# Patient Record
Sex: FEMALE | Race: WHITE | Employment: FULL TIME | URBAN - METROPOLITAN AREA
[De-identification: names, ages, dates, MRNs, and addresses within clinical notes are randomized per-mention and may not be internally consistent; named-entity substitution may affect disease eponyms.]

---

## 2017-06-20 ENCOUNTER — HOSPITAL ENCOUNTER (OUTPATIENT)
Dept: OTHER | Age: 22
Discharge: HOME | End: 2017-06-20
Attending: OBSTETRICS & GYNECOLOGY | Admitting: OBSTETRICS & GYNECOLOGY

## 2017-06-20 LAB
ABO/RH: NORMAL
ANTIBODY SCREEN: NORMAL
BASOPHILS ABSOLUTE: 0.1 K/UL (ref 0–0.2)
BASOPHILS RELATIVE PERCENT: 0.8 %
EOSINOPHILS ABSOLUTE: 0.2 K/UL (ref 0–0.6)
EOSINOPHILS RELATIVE PERCENT: 1.6 %
HCT VFR BLD CALC: 34.7 % (ref 36–48)
HEMOGLOBIN: 11.6 G/DL (ref 12–16)
LYMPHOCYTES ABSOLUTE: 1.9 K/UL (ref 1–5.1)
LYMPHOCYTES RELATIVE PERCENT: 13.8 %
MCH RBC QN AUTO: 27.9 PG (ref 26–34)
MCHC RBC AUTO-ENTMCNC: 33.5 G/DL (ref 31–36)
MCV RBC AUTO: 83.3 FL (ref 80–100)
MONOCYTES ABSOLUTE: 1.2 K/UL (ref 0–1.3)
MONOCYTES RELATIVE PERCENT: 8.7 %
NEUTROPHILS ABSOLUTE: 10.4 K/UL (ref 1.7–7.7)
NEUTROPHILS RELATIVE PERCENT: 75.1 %
PDW BLD-RTO: 16.1 % (ref 12.4–15.4)
PLATELET # BLD: 252 K/UL (ref 135–450)
PMV BLD AUTO: 8.8 FL (ref 5–10.5)
RBC # BLD: 4.17 M/UL (ref 4–5.2)
WBC # BLD: 13.8 K/UL (ref 4–11)

## 2017-06-21 PROBLEM — Z3A.39 39 WEEKS GESTATION OF PREGNANCY: Status: ACTIVE | Noted: 2017-06-21

## 2017-06-21 LAB — RPR: NORMAL

## 2020-06-23 ENCOUNTER — OFFICE VISIT (OUTPATIENT)
Dept: ORTHOPEDIC SURGERY | Age: 25
End: 2020-06-23
Payer: MEDICAID

## 2020-06-23 ENCOUNTER — TELEPHONE (OUTPATIENT)
Dept: ORTHOPEDIC SURGERY | Age: 25
End: 2020-06-23

## 2020-06-23 VITALS — BODY MASS INDEX: 38.32 KG/M2 | HEIGHT: 65 IN | TEMPERATURE: 97.2 F | WEIGHT: 230 LBS

## 2020-06-23 PROBLEM — S93.401A SPRAIN OF RIGHT ANKLE: Status: ACTIVE | Noted: 2020-06-23

## 2020-06-23 PROCEDURE — G8427 DOCREV CUR MEDS BY ELIG CLIN: HCPCS | Performed by: PHYSICIAN ASSISTANT

## 2020-06-23 PROCEDURE — G8417 CALC BMI ABV UP PARAM F/U: HCPCS | Performed by: PHYSICIAN ASSISTANT

## 2020-06-23 PROCEDURE — 99202 OFFICE O/P NEW SF 15 MIN: CPT | Performed by: PHYSICIAN ASSISTANT

## 2020-06-23 PROCEDURE — L4360 PNEUMAT WALKING BOOT PRE CST: HCPCS | Performed by: PHYSICIAN ASSISTANT

## 2020-06-23 PROCEDURE — 4004F PT TOBACCO SCREEN RCVD TLK: CPT | Performed by: PHYSICIAN ASSISTANT

## 2020-06-23 NOTE — TELEPHONE ENCOUNTER
6/23/20 DME   - NOT COVERED - FOLLOW MEDICAID FEE SCHEDULE AND ITEM NOT ON FEE SCHEDULE - SPLIT CODING CROSSWALK FOR MEDICAID   IS COVERED AND NO PRECERT REQUIRED - PER NOTES - MP

## 2020-06-23 NOTE — LETTER
[facility]  [place] [address] [address] [address]  Phone: [phone]  Fax: [phone]    Jenna Souza        June 23, 2020     Patient: Francisco Bailey   YOB: 1995   Date of Visit: 6/23/2020       To Whom It May Concern: It is my medical opinion that Lashanda Judd will be off of work for the next 2 weeks. If you have any questions or concerns, please don't hesitate to call.     Sincerely,          EZRA Souza

## 2020-06-23 NOTE — PROGRESS NOTES
ossicle off of the distal aspect of the fibula which appears to be old. Therefore I think this is a acute ankle sprain and not a fracture. The natural history of the patient's diagnosis as well as the treatment options were discussed in full and questions were answered. Risks and benefits of the treatment options also reviewed in detail. Tall Boot was applied today. May be WBAT. Procedures    Breg Tall Ashley Walking Boot     Patient was prescribed a Breg Tall Ashley Walking Boot. The right ankle will require stabilization / immobilization from this semi-rigid / rigid orthosis to improve their function. The orthosis will assist in protecting the affected area, provide functional support and facilitate healing. Patient was instructed to progress ambulation weight bearing as tolerated in the device. The patient was educated and fit by a healthcare professional with expert knowledge and specialization in brace application while under the direct supervision of the physician. Verbal and written instructions for the use of and application of this item were provided. They were instructed to contact the office immediately should the brace result in increased pain, decreased sensation, increased swelling or worsening of the condition. Rest, Ice, Compression and Elevation    OTC NSAID'S discussed to be taken in appropriate  therapeutic doses. Activity Restriction/ Modification discussed. She works as an [employer]. She will be off work for at least 2 weeks. Follow Up: 2 weeks  Call or return to clinic prn if these symptoms worsen or fail to improve as anticipated.

## 2020-06-24 ENCOUNTER — TELEPHONE (OUTPATIENT)
Dept: ORTHOPEDIC SURGERY | Age: 25
End: 2020-06-24

## 2020-07-15 ENCOUNTER — OFFICE VISIT (OUTPATIENT)
Dept: ORTHOPEDIC SURGERY | Age: 25
End: 2020-07-15
Payer: MEDICAID

## 2020-07-15 VITALS — BODY MASS INDEX: 38.32 KG/M2 | HEIGHT: 65 IN | WEIGHT: 230 LBS | TEMPERATURE: 97.1 F

## 2020-07-15 PROCEDURE — G8427 DOCREV CUR MEDS BY ELIG CLIN: HCPCS | Performed by: PHYSICIAN ASSISTANT

## 2020-07-15 PROCEDURE — 99213 OFFICE O/P EST LOW 20 MIN: CPT | Performed by: PHYSICIAN ASSISTANT

## 2020-07-15 PROCEDURE — G8417 CALC BMI ABV UP PARAM F/U: HCPCS | Performed by: PHYSICIAN ASSISTANT

## 2020-07-15 PROCEDURE — 4004F PT TOBACCO SCREEN RCVD TLK: CPT | Performed by: PHYSICIAN ASSISTANT
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
## 2020-08-04 ENCOUNTER — HOSPITAL ENCOUNTER (OUTPATIENT)
Dept: PHYSICAL THERAPY | Age: 25
Setting detail: THERAPIES SERIES
Discharge: HOME | End: 2020-08-04
Payer: MEDICAID

## 2020-08-04 PROCEDURE — 97110 THERAPEUTIC EXERCISES: CPT

## 2020-08-04 PROCEDURE — 97162 PT EVAL MOD COMPLEX 30 MIN: CPT

## 2020-08-04 ASSESSMENT — PAIN DESCRIPTION - ORIENTATION: ORIENTATION: RIGHT

## 2020-08-04 ASSESSMENT — PAIN DESCRIPTION - ONSET: ONSET: SUDDEN

## 2020-08-04 ASSESSMENT — PAIN DESCRIPTION - LOCATION: LOCATION: ANKLE

## 2020-08-04 ASSESSMENT — PAIN - FUNCTIONAL ASSESSMENT: PAIN_FUNCTIONAL_ASSESSMENT: PREVENTS OR INTERFERES SOME ACTIVE ACTIVITIES AND ADLS

## 2020-08-04 ASSESSMENT — PAIN DESCRIPTION - PAIN TYPE: TYPE: ACUTE PAIN

## 2020-08-04 ASSESSMENT — PAIN SCALES - GENERAL: PAINLEVEL_OUTOF10: 7

## 2020-08-04 ASSESSMENT — PAIN DESCRIPTION - PROGRESSION: CLINICAL_PROGRESSION: GRADUALLY IMPROVING

## 2020-08-04 ASSESSMENT — PAIN DESCRIPTION - FREQUENCY: FREQUENCY: INTERMITTENT

## 2020-08-04 ASSESSMENT — PAIN DESCRIPTION - DIRECTION: RADIATING_TOWARDS: TO MID CALF

## 2020-08-04 NOTE — PLAN OF CARE
Outpatient Physical Therapy  [] [facility]    Phone: [phone]   Fax: [phone]   [x] [facility]  Phone: [phone]              Fax: [phone]  [] [facility]   Phone: [phone]   Fax: [phone]     To: Referring Practitioner: EZRA Ernst      Patient: Jess Ku   : 1995   MRN: [id number]  Evaluation Date: 2020      Diagnosis Information:  · Diagnosis: Sprain of right ankle   · Treatment Diagnosis: Pain from sprain, weakness right ankle, decreased ROM right ankle, difficulty with walking     Physical Therapy Certification/Re-Certification Form  Dear EZRA Ernst,  The following patient has been evaluated for physical therapy services and for therapy to continue, Medicare requires monthly physician review of the treatment plan. Please review the attached evaluation and/or summary of the patient's plan of care, and verify that you agree therapy should continue by signing the attached document and sending it back to our office. Plan of Care/Treatment to date:  [x] Therapeutic Exercise    [x] Modalities:  [x] Therapeutic Activity     [] Ultrasound  [] Electrical Stimulation  [x] Gait Training      [] Cervical Traction [] Lumbar Traction  [] Neuromuscular Re-education    [] Cold/hotpack [] Iontophoresis   [x] Instruction in HEP     Other:  [x] Manual Therapy      []             [] Aquatic Therapy      []           ? Frequency/Duration:  # Days per week: [] 1 day # Weeks: [] 1 week [] 5 weeks     [x] 2 days? [] 2 weeks [] 6 weeks     [] 3 days   [] 3 weeks [] 7 weeks     [] 4 days   [x] 4 weeks [] 8 weeks    Rehab Potential: [] Excellent [x] Good [] Fair  [] Poor       Electronically signed by:  Chano Thornton PT      If you have any questions or concerns, please don't hesitate to call.   Thank you for your referral.      Physician Signature:________________________________Date:__________________  By signing above, therapists plan is approved by physician

## 2020-08-04 NOTE — PROGRESS NOTES
with rails  Entrance Stairs - Number of Steps: 6-7  Active : Yes  Occupation: Full time employment; Other(comment)(Off right now due to this injury)  Type of occupation: [employer]  Additional Comments: 3 young children    Objective     Observation/Palpation  Posture: Fair  Palpation: Tenderness on lateral malleolus and along PTT  Observation: No bruising  Edema: 11 1/4 inch on malleolar level on right , 10 1/2 inches on malleolar level on left  Scar: None    AROM RLE (degrees)  RLE AROM: Exceptions  R Ankle Dorsiflexion 0-20: -7  R Ankle Plantar Flexion 0-45: 0-70  R Ankle Forefoot Inversion 0-40: 0-20  R Ankle Forefoot Eversion 0-20: 0-10  AROM LLE (degrees)  LLE AROM : WFL  LLE General AROM: 0-20 on DF    Strength RLE  Strength RLE: Exception  R Ankle Dorsiflexion: 3-/5;2+/5  R Ankle Plantar flexion: 3-/5;2+/5  R Ankle Inversion: 3-/5;2+/5  R Ankle Eversion: 3-/5;2+/5  Strength LLE  Strength LLE: WNL        Sensation  Overall Sensation Status: Impaired(Lateral aspect of the right ankle)  Light Touch: Partial deficits in the RLE             Ambulation  Ambulation?: Yes  Ambulation 1  Surface: carpet  Device: No Device  Assistance: Independent  Quality of Gait: Lateral lean with walking, good heel to toe pattern with boot           Assessment   Conditions Requiring Skilled Therapeutic Intervention  Body structures, Functions, Activity limitations: Decreased functional mobility ; Decreased ADL status; Decreased ROM; Decreased strength; Increased pain  Assessment: PLOF: Independent  Treatment Diagnosis: Pain from sprain, weakness right ankle, decreased ROM right ankle, difficulty with walking  Prognosis: Good  Decision Making: Medium Complexity  REQUIRES PT FOLLOW UP: Yes         Plan   Plan  Times per week: 2  Plan weeks: 4  Current Treatment Recommendations: Strengthening, ROM, Manual Therapy - Soft Tissue Mobilization, Gait Training    G-Code       OutComes Score  LEFS Total Score: 32 (08/04/20 1131) AM-PAC Score             Goals  Short term goals  Time Frame for Short term goals: 4 weeks  Short term goal 1: Pt will increase strength in right anle to meet her goal  Short term goal 2: Pt will increase ROM of right ankle to resume ADL's and work  Short term goal 3: Pt will be able to wean from boot and resume normal gait  Short term goal 4: Pt will note less pain (1-2/10)  Patient Goals   Patient goals : \"Regaining strength back in foot\"       Therapy Time   Individual Concurrent Group Co-treatment   Time In 1118         Time Out 1205         Minutes 47         Timed Code Treatment Minutes: [address] [facility],

## 2020-08-04 NOTE — FLOWSHEET NOTE
Physical Therapy Daily Treatment Note  Date:  2020    Patient Name:  Heidi Pena    :  1995  MRN: [id number]    Restrictions/Precautions: Restrictions/Precautions  Restrictions/Precautions: Fall Risk(No risk of falls)  Required Braces or Orthoses?: Yes    Pertinent Medical History: Additional Pertinent Hx: PLOF: Independent    Medical/Treatment Diagnosis Information:  · Diagnosis: Sprain of right ankle  · Treatment Diagnosis: Pain from sprain, weakness right ankle, decreased ROM right ankle, difficulty with walking    Insurance/Certification information:  PT Insurance Information: [employer]  Physician Information:  Referring Practitioner: EZAR Franco  Plan of care signed (Y/N):  Sent to [facility] on 20    Visit# / total visits:    Pain level:  4-7/10     G-Code (if applicable):      Date / Visit # G-Code Applied:  /   Stevie Enter on mohan: 32/CL    Progress Note: []  Yes  [x]  No  Next due by: Visit #10      History of Injury: See below    Subjective:  Subjective  Subjective: Pt was \"horsing around\" and fell onto her full body wt onto right ankle. She initiallly went to Urgent care and brought her disc of x ray to [facility] the next morning. She was given the  boot by [facility] and has  been FW B in boot since then. Generally it is feeling better. Certain spot on her ankle are painful, aparna on lateral aspect of ankle. She still gets shooting pain through her ankle. Objective: See eval   Observation:    Test measurements:        Exercises:  Exercise/Equipment Resistance/Repetitions Other comments   DF/PF/INV/EV 20X each    Ankle Circles  20X    Ankle alphabet 1 time    HR/TR - seated 20X    Calf stretch with towel 10 sec x 10                                                    Other Therapeutic Activities:  Patient was educated on diagnosis, plan of care and prognosis of their complaint. Also, frequency and duration of treatments was discussed.  Patient was informed of the attendance policy and issued a copy for their records. Home Exercise Program: Patient was given written instructions for home exercises as above. Patient performs them correctly and understands purpose. Manual Treatments:    PROM into each range with calf stretching    Modalities:      Charges: Therapeutic Exercise:  [x] (62984) Provided verbal/tactile cueing for activities to restore or maintain strength, flexibility, endurance, ROM for improvements with self-care, mobility, lifting and ambulation. Neuromuscular Re-Education  [] (03815) Provided verbal/tactile cueing for activities to restore or maintain balance, coordination, kinesthetic sense, posture, motor skill, proprioception for self-care, mobility, lifting, and ambulation. Therapeutic Activities:    [x] (16410) Provided verbal/tactile cueing to address functional limitations related to loss of mobility, strength, balance, and coordination. Gait Training:  [] (46771) Provided training and instruction to the patient for proper postural muscle recruitment and positioning with ambulation re-education     Home Exercise Program:    [x] (01064) Reviewed/Progressed HEP activities related to strengthening, flexibility, endurance, ROM for functional self-care, mobility, lifting and ambulation   [] (44901) Reviewed/Progressed HEP activities related to improving balance, coordination, kinesthetic sense, posture, motor skill, proprioception for self-care, mobility, lifting, and ambulation      Manual Treatments:  MFR / STM / Oscillations-Mobs:  G-I, II, III, IV / Manipulation / MLD  [] (49966) Provided manual therapy to mobilize  soft tissue/joints/fluid for the purpose of modulating pain, promoting relaxation, increasing ROM, reducing/eliminating soft tissue swelling/inflammation/restriction, improving soft tissue extensibility and allowing for proper ROM for normal function with self- care, mobility, lifting and ambulation.         Timed Code Treatment Minutes: 23 Total Treatment Minutes: 47     [] EVAL (LOW) 64063   [x] EVAL (MOD) 19291   [] EVAL (HIGH) 53015   [] RE-EVAL   [x] TE (58179) x  2     [] NMR (20194)   x    [] Manual (79482) x    [] Ultrasound (92558) x  [] TA (16595) x  [] Mech Traction (36590)  [] Ionto (64424)           [] ES (un) (41364):   [] Other:      Treatment/Activity Tolerance:  [x] Patient tolerated treatment well [] Patient limited by fatigue  [] Patient limited by pain  [] Patient limited by other medical complications  [] Other:     Prognosis: [x] Good [] Fair  [] Poor    Goals:    Short term goals  Time Frame for Short term goals: 4 weeks  Short term goal 1: Pt will increase strength in right anle to meet her goal  Short term goal 2: Pt will increase ROM of right ankle to resume ADL's and work  Short term goal 3: Pt will be able to wean from boot and resume normal gait  Short term goal 4: Pt will note less pain (1-2/10)              Patient Requires Follow-up: [x] Yes  [] No    Plan:   [] Continue per plan of care [] Alter current plan (see comments)  [x] Plan of care initiated [] Hold pending MD visit [] Discharge    Plan for Next Session: See above, add manual therapy    Electronically signed by:  Yimi Ortiz, PT,

## 2020-08-12 ENCOUNTER — HOSPITAL ENCOUNTER (OUTPATIENT)
Dept: PHYSICAL THERAPY | Age: 25
Setting detail: THERAPIES SERIES
Discharge: HOME | End: 2020-08-12
Payer: MEDICAID

## 2020-08-13 ENCOUNTER — HOSPITAL ENCOUNTER (OUTPATIENT)
Dept: PHYSICAL THERAPY | Age: 25
Setting detail: THERAPIES SERIES
Discharge: HOME | End: 2020-08-13
Payer: MEDICAID

## 2020-08-13 PROCEDURE — 97140 MANUAL THERAPY 1/> REGIONS: CPT

## 2020-08-13 PROCEDURE — 97110 THERAPEUTIC EXERCISES: CPT

## 2020-08-13 NOTE — FLOWSHEET NOTE
Physical Therapy Daily Treatment Note  Date:  2020    Patient Name:  Godfrey Fontaine    :  1995  MRN: [id number]    Restrictions/Precautions:      Pertinent Medical History:      Medical/Treatment Diagnosis Information:  · Diagnosis: Sprain of right ankle  · Treatment Diagnosis: Pain from sprain, weakness right ankle, decreased ROM right ankle, difficulty with walking    Insurance/Certification information:  PT Insurance Information: Mariah Barrera  Physician Information:  Referring Practitioner: EZRA Alcazar  Plan of care signed (Y/N):  Sent to Aleksandra Suggs on 20    Visit# / total visits:   Pain level:  4/10     G-Code (if applicable):      Date / Visit # G-Code Applied:  /   Wili Moore on mohan: 32/CL    Progress Note: []  Yes  [x]  No  Next due by: Visit #10      History of Injury: See below    Subjective:  Subjective  Subjective: Pt was \"horsing around\" on 20 and fell onto her full body wt onto right ankle. She initiallly went to Urgent care and brought her disc of x ray to [id number] the next morning. She was given the  boot by [address] [facility] and has  been FW B in boot since then. Generally it is feeling better. Certain spot on her ankle are painful, aparna on lateral aspect of ankle. She still gets shooting pain through her ankle. 20: Pt reports pain on ankle is slightly less than last time. States she does HEP once daily. Objective: See eval   Observation:    Test measurements:        Exercises:  Exercise/Equipment Resistance/Repetitions Other comments   DF/PF/INV/EV 20X each Rev    Ankle Circles  20X Rev    Ankle alphabet 1 time Rev    HR/TR - seated 20X Rev    Calf stretch with towel 10 sec x 10 Rev    Nu step 4 min , WL 3    Rock - seated 2 min                                          Other Therapeutic Activities:  Patient was educated on diagnosis, plan of care and prognosis of their complaint. Also, frequency and duration of treatments was discussed.  Patient was informed of the attendance policy and issued a copy for their records. Home Exercise Program: Patient was given written instructions for home exercises as above. Patient performs them correctly and understands purpose. Manual Treatments:    PROM into each range with calf stretching, STM with pain on lateral malleolus and on sock line and on medial malleolus 15 min    Modalities:      Charges: Therapeutic Exercise:  [x] (47128) Provided verbal/tactile cueing for activities to restore or maintain strength, flexibility, endurance, ROM for improvements with self-care, mobility, lifting and ambulation. Neuromuscular Re-Education  [] (61065) Provided verbal/tactile cueing for activities to restore or maintain balance, coordination, kinesthetic sense, posture, motor skill, proprioception for self-care, mobility, lifting, and ambulation. Therapeutic Activities:    [x] (54015) Provided verbal/tactile cueing to address functional limitations related to loss of mobility, strength, balance, and coordination.      Gait Training:  [] (97149) Provided training and instruction to the patient for proper postural muscle recruitment and positioning with ambulation re-education     Home Exercise Program:    [x] (86581) Reviewed/Progressed HEP activities related to strengthening, flexibility, endurance, ROM for functional self-care, mobility, lifting and ambulation   [] (09395) Reviewed/Progressed HEP activities related to improving balance, coordination, kinesthetic sense, posture, motor skill, proprioception for self-care, mobility, lifting, and ambulation      Manual Treatments:  MFR / STM / Oscillations-Mobs:  G-I, II, III, IV / Manipulation / MLD  [] (91947) Provided manual therapy to mobilize  soft tissue/joints/fluid for the purpose of modulating pain, promoting relaxation, increasing ROM, reducing/eliminating soft tissue swelling/inflammation/restriction, improving soft tissue extensibility and allowing for proper ROM for normal function with self- care, mobility, lifting and ambulation. Timed Code Treatment Minutes: 46   Total Treatment Minutes: 47     [] EVAL (LOW) 37353   [] EVAL (MOD) 13643   [] EVAL (HIGH) 99372   [] RE-EVAL   [x] TE (40015) x  2     [] NMR (58875)   x    [x] Manual (35875) x  1   [] Ultrasound (12010) x  [] TA (35936) x  [] Mech Traction (15323)  [] Ionto (26290)           [] ES (un) (88190):   [] Other:      Treatment/Activity Tolerance:  [x] Patient tolerated treatment well [] Patient limited by fatigue  [] Patient limited by pain  [] Patient limited by other medical complications  [] Other:     Prognosis: [x] Good [] Fair  [] Poor    Goals:    Short term goals  Time Frame for Short term goals: 4 weeks  Short term goal 1: Pt will increase strength in right anle to meet her goal  Short term goal 2: Pt will increase ROM of right ankle to resume ADL's and work  Short term goal 3: Pt will be able to wean from boot and resume normal gait  Short term goal 4: Pt will note less pain (1-2/10)              Patient Requires Follow-up: [x] Yes  [] No    Plan:   [x] Continue per plan of care [] Alter current plan (see comments)  [] Plan of care initiated [] Hold pending MD visit [] Discharge    Plan for Next Session: See above, add manual therapy. Pt will see MD on Tuesday. Will ask about weaning from boot and compressive sleeve.     Electronically signed by:  Emerson Barker, PT,

## 2020-08-18 ENCOUNTER — OFFICE VISIT (OUTPATIENT)
Dept: ORTHOPEDIC SURGERY | Age: 25
End: 2020-08-18
Payer: MEDICAID

## 2020-08-18 VITALS — TEMPERATURE: 97.5 F

## 2020-08-18 PROCEDURE — 4004F PT TOBACCO SCREEN RCVD TLK: CPT | Performed by: PHYSICIAN ASSISTANT

## 2020-08-18 PROCEDURE — G8417 CALC BMI ABV UP PARAM F/U: HCPCS | Performed by: PHYSICIAN ASSISTANT

## 2020-08-18 PROCEDURE — G8427 DOCREV CUR MEDS BY ELIG CLIN: HCPCS | Performed by: PHYSICIAN ASSISTANT

## 2020-08-18 PROCEDURE — L1902 AFO ANKLE GAUNTLET PRE OTS: HCPCS | Performed by: PHYSICIAN ASSISTANT

## 2020-08-18 PROCEDURE — 99213 OFFICE O/P EST LOW 20 MIN: CPT | Performed by: PHYSICIAN ASSISTANT

## 2020-08-18 NOTE — LETTER
[facility]  [facility] [address] [address]  Phone: [phone]  Fax: [phone]    Ross Muñiz, [address]        August 18, 2020     Patient: Jesus Duggan   YOB: 1995   Date of Visit: 8/18/2020       To Whom It May Concern: It is my medical opinion that Netta Moreira may return to work on August 24, 2020. She may work 8 hours a day for 3 days a week. This will be for 3 weeks. If you have any questions or concerns, please don't hesitate to call.     Sincerely,          EZRA Palomino
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
## 2020-08-19 ENCOUNTER — HOSPITAL ENCOUNTER (OUTPATIENT)
Dept: PHYSICAL THERAPY | Age: 25
Setting detail: THERAPIES SERIES
Discharge: HOME | End: 2020-08-19
Payer: MEDICAID

## 2020-08-21 ENCOUNTER — HOSPITAL ENCOUNTER (OUTPATIENT)
Dept: PHYSICAL THERAPY | Age: 25
Setting detail: THERAPIES SERIES
Discharge: HOME | End: 2020-08-21
Payer: MEDICAID

## 2020-08-21 PROCEDURE — 97140 MANUAL THERAPY 1/> REGIONS: CPT

## 2020-08-21 PROCEDURE — 97035 APP MDLTY 1+ULTRASOUND EA 15: CPT

## 2020-08-21 PROCEDURE — 97110 THERAPEUTIC EXERCISES: CPT

## 2020-08-21 NOTE — FLOWSHEET NOTE
Physical Therapy Daily Treatment Note  Date:  2020    Patient Name:  Olya Bangura    :  1995  MRN: [id number]    Restrictions/Precautions:      Pertinent Medical History:      Medical/Treatment Diagnosis Information:  · Diagnosis: Sprain of right ankle  · Treatment Diagnosis: Pain from sprain, weakness right ankle, decreased ROM right ankle, difficulty with walking    Insurance/Certification information:  PT Insurance Information: Juliann Ralph  Physician Information:  Referring Practitioner: EZRA Quintero  Plan of care signed (Y/N):  Sent to [facility] Benavides on 20    Visit# / total visits: 3/8  Pain level:  5-6/10     G-Code (if applicable):      Date / Visit # G-Code Applied:  /   Gabby Vu on mohan: 32/CL    Progress Note: []  Yes  [x]  No  Next due by: Visit #10      History of Injury: See below    Subjective:  Subjective  Subjective: Pt was \"horsing around\" on 20 and fell onto her full body wt onto right ankle. She initiallly went to Urgent care and brought her disc of x ray to [facility] the next morning. She was given the  boot by [facility] and has  been FW B in boot since then. Generally it is feeling better. Certain spot on her ankle are painful, aparna on lateral aspect of ankle. She still gets shooting pain through her ankle. 20: Pt reports pain on ankle is slightly less than last time. States she does HEP once daily. 20: Patient reports ankle has been sore and swollen after working 8 hours yesterday. States she weaned off the boot too.     Objective: See eval   Observation:    Test measurements:        Exercises:  Exercise/Equipment Resistance/Repetitions Other comments   DF/PF/INV/EV 20X each Rev    Ankle Circles  20X Rev    Ankle alphabet 1 time Rev    HR/TR - seated 20X Rev    Calf stretch with towel 10 sec x 10 Rev    Nu step 4 min , WL 3    Rock - seated 2 min                                          Other Therapeutic Activities:  Patient was educated on diagnosis, plan of care and prognosis of their complaint. Also, frequency and duration of treatments was discussed. Patient was informed of the attendance policy and issued a copy for their records. Home Exercise Program: Patient was given written instructions for home exercises as above. Patient performs them correctly and understands purpose. Manual Treatments:    PROM into each range with calf stretching, STM with pain on lateral malleolus and on sock line and on medial malleolus 15 min kinesio tape    Modalities:  US 50 % at 1.2 w/cm2 to right lat ankle x 8 min    Charges: Therapeutic Exercise:  [x] (90020) Provided verbal/tactile cueing for activities to restore or maintain strength, flexibility, endurance, ROM for improvements with self-care, mobility, lifting and ambulation. Neuromuscular Re-Education  [] (66783) Provided verbal/tactile cueing for activities to restore or maintain balance, coordination, kinesthetic sense, posture, motor skill, proprioception for self-care, mobility, lifting, and ambulation. Therapeutic Activities:    [x] (11003) Provided verbal/tactile cueing to address functional limitations related to loss of mobility, strength, balance, and coordination.      Gait Training:  [] (64756) Provided training and instruction to the patient for proper postural muscle recruitment and positioning with ambulation re-education     Home Exercise Program:    [x] (42988) Reviewed/Progressed HEP activities related to strengthening, flexibility, endurance, ROM for functional self-care, mobility, lifting and ambulation   [] (85730) Reviewed/Progressed HEP activities related to improving balance, coordination, kinesthetic sense, posture, motor skill, proprioception for self-care, mobility, lifting, and ambulation      Manual Treatments:  MFR / STM / Oscillations-Mobs:  G-I, II, III, IV / Manipulation / MLD  [] (57106) Provided manual therapy to mobilize  soft tissue/joints/fluid for the purpose of modulating pain, promoting relaxation, increasing ROM, reducing/eliminating soft tissue swelling/inflammation/restriction, improving soft tissue extensibility and allowing for proper ROM for normal function with self- care, mobility, lifting and ambulation. Timed Code Treatment Minutes: 46   Total Treatment Minutes: 47     [] EVAL (LOW) 96818   [] EVAL (MOD) 72432   [] EVAL (HIGH) 62784   [] RE-EVAL   [x] TE (70448) x  1     [] NMR (97626)   x    [x] Manual (55014) x  1   [x] Ultrasound (91241) x 1  [] TA (94539) x  [] Mech Traction (34581)  [] Ionto (42840)           [] ES (un) (83189):   [] Other:      Treatment/Activity Tolerance:  [x] Patient tolerated treatment well [] Patient limited by fatigue  [] Patient limited by pain  [] Patient limited by other medical complications  [] Other:     Prognosis: [x] Good [] Fair  [] Poor    Goals:    Short term goals  Time Frame for Short term goals: 4 weeks  Short term goal 1: Pt will increase strength in right anle to meet her goal  Short term goal 2: Pt will increase ROM of right ankle to resume ADL's and work  Short term goal 3: Pt will be able to wean from boot and resume normal gait  Short term goal 4: Pt will note less pain (1-2/10)              Patient Requires Follow-up: [x] Yes  [] No    Plan:   [x] Continue per plan of care [] Alter current plan (see comments)  [] Plan of care initiated [] Hold pending MD visit [] Discharge    Plan for Next Session: See above, add manual therapy. Pt will see MD on Tuesday. Will ask about weaning from boot and compressive sleeve.     Electronically signed by:  Luke Waterman PTA,

## 2020-08-24 ENCOUNTER — TELEPHONE (OUTPATIENT)
Dept: ORTHOPEDIC SURGERY | Age: 25
End: 2020-08-24

## 2020-08-24 NOTE — TELEPHONE ENCOUNTER
I called and patient stated that she tried to go back to work, after an hour pain began. She is asking to be off work.     DOI 6/22/2020

## 2020-08-24 NOTE — TELEPHONE ENCOUNTER
[facility] ok'd to extend off of work. He wants her to get MRI. Work note emailed to [email or web address]

## 2020-08-26 ENCOUNTER — HOSPITAL ENCOUNTER (OUTPATIENT)
Dept: PHYSICAL THERAPY | Age: 25
Setting detail: THERAPIES SERIES
Discharge: HOME | End: 2020-08-26
Payer: MEDICAID

## 2020-08-26 PROCEDURE — 97110 THERAPEUTIC EXERCISES: CPT

## 2020-08-26 PROCEDURE — 97035 APP MDLTY 1+ULTRASOUND EA 15: CPT

## 2020-08-26 PROCEDURE — 97140 MANUAL THERAPY 1/> REGIONS: CPT

## 2020-08-26 NOTE — FLOWSHEET NOTE
, WL 3    Rock - seated 2 min    Leg press/ 75# x 10  Not able to do calf press   Incline stretch  Ceased due to pain                               Other Therapeutic Activities:  Patient was educated on diagnosis, plan of care and prognosis of their complaint. Also, frequency and duration of treatments was discussed. Patient was informed of the attendance policy and issued a copy for their records. Home Exercise Program: Patient was given written instructions for home exercises as above. Patient performs them correctly and understands purpose. Manual Treatments:    PROM into each range with calf stretching, STM with pain on lateral malleolus and on sock line and on medial malleolus 15 min kinesio tape    Modalities:  US 50 % at 1.2 w/cm2 to right lat ankle x 10 min    Charges: Therapeutic Exercise:  [x] (33428) Provided verbal/tactile cueing for activities to restore or maintain strength, flexibility, endurance, ROM for improvements with self-care, mobility, lifting and ambulation. Neuromuscular Re-Education  [] (93857) Provided verbal/tactile cueing for activities to restore or maintain balance, coordination, kinesthetic sense, posture, motor skill, proprioception for self-care, mobility, lifting, and ambulation. Therapeutic Activities:    [x] (47648) Provided verbal/tactile cueing to address functional limitations related to loss of mobility, strength, balance, and coordination.      Gait Training:  [] (73486) Provided training and instruction to the patient for proper postural muscle recruitment and positioning with ambulation re-education     Home Exercise Program:    [x] (19233) Reviewed/Progressed HEP activities related to strengthening, flexibility, endurance, ROM for functional self-care, mobility, lifting and ambulation   [] (32537) Reviewed/Progressed HEP activities related to improving balance, coordination, kinesthetic sense, posture, motor skill, proprioception for self-care, mobility,

## 2020-08-28 ENCOUNTER — HOSPITAL ENCOUNTER (OUTPATIENT)
Dept: PHYSICAL THERAPY | Age: 25
Setting detail: THERAPIES SERIES
Discharge: HOME | End: 2020-08-28
Payer: MEDICAID

## 2020-08-28 NOTE — FLOWSHEET NOTE
Physical Therapy  Cancellation/No-show Note  Patient Name:  Ramona Doty  :  1995   Date:  2020  Cancelled visits to date: 3  No-shows to date: 0    For today's appointment patient:  [x]  Cancelled  []  Rescheduled appointment  []  No-show     Reason given by patient:  []  Patient ill  []  Conflicting appointment  []  No transportation    []  Conflict with work  []  No reason given  [x]  Other:     Comments:  Called patient regarding missed appointment. They were reminded of the attendance policy and will be discharged if they miss again. Reminded them of their next appointment time and date and to call if they are going to miss. Told her that we will give her one more chance, and if she misses, we will discharge. Pt cancelled due to no childcare.   Electronically signed by:  Gabbie Ochoa PT

## 2020-09-02 ENCOUNTER — HOSPITAL ENCOUNTER (OUTPATIENT)
Dept: MRI IMAGING | Age: 25
Discharge: HOME | End: 2020-09-02
Payer: MEDICAID

## 2020-09-02 ENCOUNTER — HOSPITAL ENCOUNTER (OUTPATIENT)
Dept: PHYSICAL THERAPY | Age: 25
Setting detail: THERAPIES SERIES
Discharge: HOME | End: 2020-09-02
Payer: MEDICAID

## 2020-09-02 PROCEDURE — 97110 THERAPEUTIC EXERCISES: CPT

## 2020-09-02 PROCEDURE — 73721 MRI JNT OF LWR EXTRE W/O DYE: CPT

## 2020-09-02 PROCEDURE — 97035 APP MDLTY 1+ULTRASOUND EA 15: CPT

## 2020-09-02 NOTE — FLOWSHEET NOTE
Physical Therapy Daily Treatment Note  Date:  2020    Patient Name:  Jess Ku    :  1995  MRN: [id number]    Restrictions/Precautions:      Pertinent Medical History:      Medical/Treatment Diagnosis Information:  · Diagnosis: Sprain of right ankle  · Treatment Diagnosis: Pain from sprain, weakness right ankle, decreased ROM right ankle, difficulty with walking    Insurance/Certification information:  PT Insurance Information: [employer]  Physician Information:  Referring Practitioner: EZRA Ernst  Plan of care signed (Y/N):  Sent to Brigido Larkin on 20    Visit# / total visits:   Pain level:  4/10     G-Code (if applicable):      Date / Visit # G-Code Applied:  /   Nishant Tracey on mohan: 32/CL    Progress Note: []  Yes  [x]  No  Next due by: Visit #10      History of Injury: See below    Subjective:  Subjective  Subjective: Pt was \"horsing around\" on 20 and fell onto her full body wt onto right ankle. She initiallly went to Urgent care and brought her disc of x ray to [id number] the next morning. She was given the  boot by [id number] and has  been FW B in boot since then. Generally it is feeling better. Certain spot on her ankle are painful, aparna on lateral aspect of ankle. She still gets shooting pain through her ankle. 20: Pt reports pain on ankle is slightly less than last time. States she does HEP once daily. 20: Patient reports ankle has been sore and swollen after working 8 hours yesterday. States she weaned off the boot too.  20: Pt reports that she will have her MRI of ankle on 20. She started back to work but stopped again until after her MRI.   20 15 min late Pt reports no changes since her last PT visit, pain is about the same.      Objective: See eval   Observation:    Test measurements:        Exercises:  Exercise/Equipment Resistance/Repetitions Other comments   DF/PF/INV/EV 20X each Rev    Ankle Circles  20X Rev    Ankle alphabet 1 time Rev  HR/TR - seated 20X Rev 8/13   Calf stretch with towel 10 sec x 10 Rev 8/13   Nu step 5 min , WL 5    Rock - seated 2 min    Leg press/ 90 # x 20 x2 Not able to do calf press   Incline stretch @ 20%  1/2 foot on 1 min rafa fair                                Other Therapeutic Activities:  Patient was educated on diagnosis, plan of care and prognosis of their complaint. Also, frequency and duration of treatments was discussed. Patient was informed of the attendance policy and issued a copy for their records. Home Exercise Program: Patient was given written instructions for home exercises as above. Patient performs them correctly and understands purpose. Manual Treatments:     medial malleolus kinesio tape    Modalities:  US 50 % at 1.2 w/cm2 to right lat ankle x 8 min    Charges: Therapeutic Exercise:  [x] (11197) Provided verbal/tactile cueing for activities to restore or maintain strength, flexibility, endurance, ROM for improvements with self-care, mobility, lifting and ambulation. Neuromuscular Re-Education  [] (28773) Provided verbal/tactile cueing for activities to restore or maintain balance, coordination, kinesthetic sense, posture, motor skill, proprioception for self-care, mobility, lifting, and ambulation. Therapeutic Activities:    [x] (74545) Provided verbal/tactile cueing to address functional limitations related to loss of mobility, strength, balance, and coordination.      Gait Training:  [] (65690) Provided training and instruction to the patient for proper postural muscle recruitment and positioning with ambulation re-education     Home Exercise Program:    [x] (29875) Reviewed/Progressed HEP activities related to strengthening, flexibility, endurance, ROM for functional self-care, mobility, lifting and ambulation   [] (89085) Reviewed/Progressed HEP activities related to improving balance, coordination, kinesthetic sense, posture, motor skill, proprioception for self-care, mobility, lifting, and ambulation      Manual Treatments:  MFR / STM / Oscillations-Mobs:  G-I, II, III, IV / Manipulation / MLD  [] (72082) Provided manual therapy to mobilize  soft tissue/joints/fluid for the purpose of modulating pain, promoting relaxation, increasing ROM, reducing/eliminating soft tissue swelling/inflammation/restriction, improving soft tissue extensibility and allowing for proper ROM for normal function with self- care, mobility, lifting and ambulation. Timed Code Treatment Minutes: 35   Total Treatment Minutes: 35     [] EVAL (LOW) 36203   [] EVAL (MOD) 35588   [] EVAL (HIGH) 39092   [] RE-EVAL   [x] TE (02719) x  1     [] NMR (40024)   x    [] Manual (57840) x  1   [x] Ultrasound (35813) x 1  [] TA (48478) x  [] Mech Traction (40267)  [] Ionto (08723)           [] ES (un) (53378):   [] Other:      Treatment/Activity Tolerance:  [x] Patient tolerated treatment well [] Patient limited by fatigue  [] Patient limited by pain  [] Patient limited by other medical complications  [] Other:     Prognosis: [x] Good [] Fair  [] Poor    Goals:    Short term goals  Time Frame for Short term goals: 4 weeks  Short term goal 1: Pt will increase strength in right anle to meet her goal  Short term goal 2: Pt will increase ROM of right ankle to resume ADL's and work  Short term goal 3: Pt will be able to wean from boot and resume normal gait  Short term goal 4: Pt will note less pain (1-2/10)              Patient Requires Follow-up: [x] Yes  [] No    Plan:   [x] Continue per plan of care [] Alter current plan (see comments)  [] Plan of care initiated [] Hold pending MD visit [] Discharge    Plan for Next Session: See above, add manual therapy. Pt will see MD on Tuesday. Will ask about weaning from boot and compressive sleeve.     Electronically signed by:  Diego Head PT,

## 2020-09-03 ENCOUNTER — OFFICE VISIT (OUTPATIENT)
Dept: ORTHOPEDIC SURGERY | Age: 25
End: 2020-09-03
Payer: MEDICAID

## 2020-09-03 VITALS — HEIGHT: 65 IN | WEIGHT: 230 LBS | TEMPERATURE: 97.9 F | BODY MASS INDEX: 38.32 KG/M2

## 2020-09-03 PROCEDURE — 99406 BEHAV CHNG SMOKING 3-10 MIN: CPT | Performed by: ORTHOPAEDIC SURGERY

## 2020-09-03 PROCEDURE — G8417 CALC BMI ABV UP PARAM F/U: HCPCS | Performed by: ORTHOPAEDIC SURGERY

## 2020-09-03 PROCEDURE — G8427 DOCREV CUR MEDS BY ELIG CLIN: HCPCS | Performed by: ORTHOPAEDIC SURGERY

## 2020-09-03 PROCEDURE — 4004F PT TOBACCO SCREEN RCVD TLK: CPT | Performed by: ORTHOPAEDIC SURGERY

## 2020-09-03 PROCEDURE — 99214 OFFICE O/P EST MOD 30 MIN: CPT | Performed by: ORTHOPAEDIC SURGERY
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
## 2020-09-04 ENCOUNTER — HOSPITAL ENCOUNTER (OUTPATIENT)
Dept: PHYSICAL THERAPY | Age: 25
Setting detail: THERAPIES SERIES
Discharge: HOME | End: 2020-09-04
Payer: MEDICAID

## 2020-09-04 ENCOUNTER — TELEPHONE (OUTPATIENT)
Dept: ORTHOPEDIC SURGERY | Age: 25
End: 2020-09-04

## 2020-09-04 PROCEDURE — 97110 THERAPEUTIC EXERCISES: CPT

## 2020-09-04 PROCEDURE — 97140 MANUAL THERAPY 1/> REGIONS: CPT

## 2020-09-04 NOTE — FLOWSHEET NOTE
navicular likely    representing bone contusion/posttraumatic marrow edema.  Marrow edema along    the syndesmotic aspect of the distal tibia, likely posttraumatic. 2. Complete ATFL tear with tiny avulsion fracture fragments adjacent to the    lateral malleolus.  Small tibiotalar joint effusion.  Grade 1 calcaneofibular    ligament sprain. 3. Mild degenerative changes at the midfoot. 4. Os trigonum.        Objective: See eval   Observation:    Test measurements:        Exercises:  Exercise/Equipment Resistance/Repetitions Other comments   20X each Rev 8/13   20X Rev 8/13   1 time Rev 8/13   HR/TR - seated 20X Rev 8/13   Calf stretch with towel 10 sec x 10 Rev 8/13   Nu step 5 min , WL 5    Rock - seated 2 min    Leg press/ 90 # x 20 x2 Not able to do calf press   Incline stretch @ 20%  1/2 foot on 1 min rafa fair                                Other Therapeutic Activities:  Patient was educated on diagnosis, plan of care and prognosis of their complaint. Also, frequency and duration of treatments was discussed. Patient was informed of the attendance policy and issued a copy for their records. Home Exercise Program: Patient was given written instructions for home exercises as above. Patient performs them correctly and understands purpose. Manual Treatments:    PROM into each range with calf stretching, STM with pain on lateral malleolus and on sock line and on medial malleolus  X 15 min    Modalities:    Charges: Therapeutic Exercise:  [x] (80521) Provided verbal/tactile cueing for activities to restore or maintain strength, flexibility, endurance, ROM for improvements with self-care, mobility, lifting and ambulation. Neuromuscular Re-Education  [] (69345) Provided verbal/tactile cueing for activities to restore or maintain balance, coordination, kinesthetic sense, posture, motor skill, proprioception for self-care, mobility, lifting, and ambulation.      Therapeutic Activities:    [x] (46132) Provided verbal/tactile cueing to address functional limitations related to loss of mobility, strength, balance, and coordination. Gait Training:  [] (06886) Provided training and instruction to the patient for proper postural muscle recruitment and positioning with ambulation re-education     Home Exercise Program:    [x] (02978) Reviewed/Progressed HEP activities related to strengthening, flexibility, endurance, ROM for functional self-care, mobility, lifting and ambulation   [] (61487) Reviewed/Progressed HEP activities related to improving balance, coordination, kinesthetic sense, posture, motor skill, proprioception for self-care, mobility, lifting, and ambulation      Manual Treatments:  MFR / STM / Oscillations-Mobs:  G-I, II, III, IV / Manipulation / MLD  [] (57030) Provided manual therapy to mobilize  soft tissue/joints/fluid for the purpose of modulating pain, promoting relaxation, increasing ROM, reducing/eliminating soft tissue swelling/inflammation/restriction, improving soft tissue extensibility and allowing for proper ROM for normal function with self- care, mobility, lifting and ambulation.         Timed Code Treatment Minutes: 45   Total Treatment Minutes: 45     [] EVAL (LOW) 45986   [] EVAL (MOD) 16378   [] EVAL (HIGH) 37147   [] RE-EVAL   [x] TE (96488) x  2     [] NMR (34666)   x    [x] Manual (39745) x  1   [] Ultrasound (33595) x 1  [] TA (81121) x  [] Mech Traction (81075)  [] Ionto (58153)           [] ES (un) (55934):   [] Other:      Treatment/Activity Tolerance:  [x] Patient tolerated treatment well [] Patient limited by fatigue  [] Patient limited by pain  [] Patient limited by other medical complications  [] Other:     Prognosis: [x] Good [] Fair  [] Poor    Goals:    Short term goals  Time Frame for Short term goals: 4 weeks  Short term goal 1: Pt will increase strength in right anle to meet her goal  Short term goal 2: Pt will increase ROM of right ankle to resume ADL's and

## 2020-09-04 NOTE — PLAN OF CARE
Outpatient Physical Therapy  [] [facility]    Phone: [phone]   Fax: [phone]   [x] [facility]  Phone: [phone]              Fax: [phone]  [] [facility]   Phone: [phone]   Fax: [phone]     To:   Dr. Slick Neil     Patient: Misty Barbosa   : 1995   MRN: [id number]  Evaluation Date: 2020      Diagnosis Information:  ·   Diagnosis: Sprain of right ankle            · Treatment Diagnosis: Pain from sprain, weakness right ankle, decreased ROM right ankle, difficulty with walking   ·     ·       Physical Therapy Certification/Re-Certification Form  Dear Dr. Slick Neil,    The following patient has been evaluated for physical therapy services and for therapy to continue, Medicare requires monthly physician review of the treatment plan. Please review the attached evaluation and/or summary of the patient's plan of care, and verify that you agree therapy should continue by signing the attached document and sending it back to our office. Plan of Care/Treatment to date:  [x] Therapeutic Exercise    [x] Modalities:  [x] Therapeutic Activity     [] Ultrasound  [] Electrical Stimulation  [x] Gait Training      [] Cervical Traction [] Lumbar Traction  [] Neuromuscular Re-education    [] Cold/hotpack [] Iontophoresis   [x] Instruction in HEP     Other:  [x] Manual Therapy      []             [] Aquatic Therapy      []           ? Frequency/Duration:  # Days per week: [] 1 day # Weeks: [] 1 week [] 5 weeks     [x] 2 days? [] 2 weeks [] 6 weeks     [] 3 days   [] 3 weeks [] 7 weeks     [] 4 days   [x] 4 weeks [] 8 weeks    (To cover additional visits, as suggested by Dr. Slick Neil)    Rehab Potential: [] Excellent [x] Good [] Fair  [] Poor       Electronically signed by:  Makeda Vora PT      If you have any questions or concerns, please don't hesitate to call.   Thank you for your referral.      Physician Signature:________________________________Date:__________________  By signing

## 2020-09-04 NOTE — TELEPHONE ENCOUNTER
Called and spoke to [place], informed her she can be sit down job x2 weeks, light duty sitting x2 weeks then RTW full duty.  Patient will  note today at [facility]  After therapy appt

## 2020-09-04 NOTE — LETTER
[facility]  [address] [address]  Phone: [phone]  Fax: [phone]    Felix Hartman MD        September 4, 2020     Patient: Stacey Littlejohn   YOB: 1995   Date of Visit: 9/4/2020       To Whom It May Concern: It is my medical opinion that [facility] may return to work on 09/04/2020 with a sit down job for 2 weeks. Starting on 09/19/2020 she will return to work with 10 minute sitting breaks per hour for 2 weeks. She keyonna return to work full duty on 10/03/2020 . If you have any questions or concerns, please don't hesitate to call.     Sincerely,    Felix Hartman MD

## 2020-09-06 PROBLEM — F17.200 CURRENT SMOKER: Status: ACTIVE | Noted: 2020-09-06

## 2020-09-15 ENCOUNTER — HOSPITAL ENCOUNTER (OUTPATIENT)
Dept: PHYSICAL THERAPY | Age: 25
Setting detail: THERAPIES SERIES
Discharge: HOME | End: 2020-09-15
Payer: MEDICAID

## 2020-09-15 PROCEDURE — 97110 THERAPEUTIC EXERCISES: CPT

## 2020-09-15 PROCEDURE — 97140 MANUAL THERAPY 1/> REGIONS: CPT

## 2020-09-15 NOTE — FLOWSHEET NOTE
medial navicular likely    representing bone contusion/posttraumatic marrow edema.  Marrow edema along    the syndesmotic aspect of the distal tibia, likely posttraumatic. 2. Complete ATFL tear with tiny avulsion fracture fragments adjacent to the    lateral malleolus.  Small tibiotalar joint effusion.  Grade 1 calcaneofibular    ligament sprain. 3. Mild degenerative changes at the midfoot. 4. Os trigonum. 9/15/20:  Been good. 2/10 pain. No light duty at work, so will not return to work until next week. Is completely out of boot, but still has ankle brace    Objective: See eval   Observation:    Test measurements:        Exercises:  Exercise/Equipment Resistance/Repetitions Other comments   20X each Rev 8/13   20X Rev 8/13   1 time Rev 8/13   HR/TR - seated 20X Rev 8/13   Calf stretch with towel 10 sec x 10 Rev 8/13   Nu step 5 min , WL 5    Rock - seated 2 min    Leg press/ 90 # x 20 x2 Not able to do calf press   Incline stretch @ 20%  1/2 foot on 1 min rafa fair                                Other Therapeutic Activities:  Patient was educated on diagnosis, plan of care and prognosis of their complaint. Also, frequency and duration of treatments was discussed. Patient was informed of the attendance policy and issued a copy for their records. Home Exercise Program: Patient was given written instructions for home exercises as above. Patient performs them correctly and understands purpose. Manual Treatments:    PROM into each range with calf stretching, STM with pain on lateral malleolus and on sock line and on medial malleolus  X 15 min    Modalities:    Charges: Therapeutic Exercise:  [x] (15825) Provided verbal/tactile cueing for activities to restore or maintain strength, flexibility, endurance, ROM for improvements with self-care, mobility, lifting and ambulation.     Neuromuscular Re-Education  [] (50937) Provided verbal/tactile cueing for activities to restore or maintain balance, coordination, kinesthetic sense, posture, motor skill, proprioception for self-care, mobility, lifting, and ambulation. Therapeutic Activities:    [x] (26635) Provided verbal/tactile cueing to address functional limitations related to loss of mobility, strength, balance, and coordination. Gait Training:  [] (38353) Provided training and instruction to the patient for proper postural muscle recruitment and positioning with ambulation re-education     Home Exercise Program:    [x] (56424) Reviewed/Progressed HEP activities related to strengthening, flexibility, endurance, ROM for functional self-care, mobility, lifting and ambulation   [] (77319) Reviewed/Progressed HEP activities related to improving balance, coordination, kinesthetic sense, posture, motor skill, proprioception for self-care, mobility, lifting, and ambulation      Manual Treatments:  MFR / STM / Oscillations-Mobs:  G-I, II, III, IV / Manipulation / MLD  [] (47218) Provided manual therapy to mobilize  soft tissue/joints/fluid for the purpose of modulating pain, promoting relaxation, increasing ROM, reducing/eliminating soft tissue swelling/inflammation/restriction, improving soft tissue extensibility and allowing for proper ROM for normal function with self- care, mobility, lifting and ambulation.         Timed Code Treatment Minutes: 45   Total Treatment Minutes: 45     [] EVAL (LOW) 55934   [] EVAL (MOD) 93133   [] EVAL (HIGH) 29536   [] RE-EVAL   [x] TE (36197) x  2     [] NMR (26662)   x    [x] Manual (64156) x  1   [] Ultrasound (69657) x 1  [] TA (94272) x  [] Mech Traction (07526)  [] Ionto (37511)           [] ES (un) (36242):   [] Other:      Treatment/Activity Tolerance:  [x] Patient tolerated treatment well [] Patient limited by fatigue  [] Patient limited by pain  [] Patient limited by other medical complications  [] Other:     Prognosis: [x] Good [] Fair  [] Poor    Goals:    Short term goals  Time Frame for Short term goals: 4 weeks  Short term goal 1: Pt will increase strength in right anle to meet her goal  Short term goal 2: Pt will increase ROM of right ankle to resume ADL's and work  Short term goal 3: Pt will be able to wean from boot and resume normal gait  Short term goal 4: Pt will note less pain (1-2/10)              Patient Requires Follow-up: [x] Yes  [] No    Plan:   [x] Continue per plan of care [] Alter current plan (see comments)  [] Plan of care initiated [] Hold pending MD visit [] Discharge    Plan for Next Session: See above, add manual therapy.   Electronically signed by:  Kiera Krishnan PTA

## 2020-09-22 ENCOUNTER — APPOINTMENT (OUTPATIENT)
Dept: PHYSICAL THERAPY | Age: 25
End: 2020-09-22
Payer: MEDICAID

## 2020-09-24 ENCOUNTER — APPOINTMENT (OUTPATIENT)
Dept: PHYSICAL THERAPY | Age: 25
End: 2020-09-24
Payer: MEDICAID

## 2020-10-07 ENCOUNTER — OFFICE VISIT (OUTPATIENT)
Dept: ORTHOPEDIC SURGERY | Age: 25
End: 2020-10-07
Payer: MEDICAID

## 2020-10-07 VITALS — TEMPERATURE: 97.2 F | HEIGHT: 65 IN | WEIGHT: 230 LBS | BODY MASS INDEX: 38.32 KG/M2

## 2020-10-07 PROCEDURE — 4004F PT TOBACCO SCREEN RCVD TLK: CPT | Performed by: ORTHOPAEDIC SURGERY

## 2020-10-07 PROCEDURE — G8417 CALC BMI ABV UP PARAM F/U: HCPCS | Performed by: ORTHOPAEDIC SURGERY

## 2020-10-07 PROCEDURE — 99213 OFFICE O/P EST LOW 20 MIN: CPT | Performed by: ORTHOPAEDIC SURGERY

## 2020-10-07 PROCEDURE — 99406 BEHAV CHNG SMOKING 3-10 MIN: CPT | Performed by: ORTHOPAEDIC SURGERY

## 2020-10-07 PROCEDURE — G8484 FLU IMMUNIZE NO ADMIN: HCPCS | Performed by: ORTHOPAEDIC SURGERY

## 2020-10-07 PROCEDURE — G8427 DOCREV CUR MEDS BY ELIG CLIN: HCPCS | Performed by: ORTHOPAEDIC SURGERY

## 2020-10-07 NOTE — LETTER
[facility]  [place] [address] [address] [address]  Phone: [phone]  Fax: [phone]    Morgan Valles MD        October 7, 2020     Patient: Renetta Anderson   YOB: 1995   Date of Visit: 10/7/2020       To Whom it May Concern:    Rachel Arizmendi was seen in my clinic on 10/7/2020. If you have any questions or concerns, please don't hesitate to call.     Sincerely,         Morgan Valles MD
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
